# Patient Record
Sex: FEMALE | Race: BLACK OR AFRICAN AMERICAN | NOT HISPANIC OR LATINO | URBAN - METROPOLITAN AREA
[De-identification: names, ages, dates, MRNs, and addresses within clinical notes are randomized per-mention and may not be internally consistent; named-entity substitution may affect disease eponyms.]

---

## 2022-01-11 ENCOUNTER — OUTPATIENT (OUTPATIENT)
Dept: OUTPATIENT SERVICES | Facility: HOSPITAL | Age: 42
LOS: 1 days | End: 2022-01-11
Payer: COMMERCIAL

## 2022-01-11 ENCOUNTER — RESULT REVIEW (OUTPATIENT)
Age: 42
End: 2022-01-11

## 2022-01-11 ENCOUNTER — APPOINTMENT (OUTPATIENT)
Dept: RADIOLOGY | Facility: HOSPITAL | Age: 42
End: 2022-01-11

## 2022-01-11 DIAGNOSIS — R76.12 NONSPECIFIC REACTION TO CELL MEDIATED IMMUNITY MEASUREMENT OF GAMMA INTERFERON ANTIGEN RESPONSE WITHOUT ACTIVE TUBERCULOSIS: ICD-10-CM

## 2022-01-11 PROCEDURE — 71046 X-RAY EXAM CHEST 2 VIEWS: CPT | Mod: 26

## 2023-08-24 ENCOUNTER — EMERGENCY (EMERGENCY)
Facility: HOSPITAL | Age: 43
LOS: 0 days | Discharge: HOME | End: 2023-08-24
Attending: STUDENT IN AN ORGANIZED HEALTH CARE EDUCATION/TRAINING PROGRAM
Payer: COMMERCIAL

## 2023-08-24 VITALS
DIASTOLIC BLOOD PRESSURE: 77 MMHG | HEIGHT: 62 IN | HEART RATE: 77 BPM | TEMPERATURE: 98 F | RESPIRATION RATE: 18 BRPM | WEIGHT: 145.06 LBS | OXYGEN SATURATION: 100 % | SYSTOLIC BLOOD PRESSURE: 119 MMHG

## 2023-08-24 VITALS
RESPIRATION RATE: 16 BRPM | SYSTOLIC BLOOD PRESSURE: 104 MMHG | DIASTOLIC BLOOD PRESSURE: 62 MMHG | OXYGEN SATURATION: 100 % | HEART RATE: 75 BPM

## 2023-08-24 DIAGNOSIS — M79.606 PAIN IN LEG, UNSPECIFIED: ICD-10-CM

## 2023-08-24 DIAGNOSIS — Z88.2 ALLERGY STATUS TO SULFONAMIDES: ICD-10-CM

## 2023-08-24 DIAGNOSIS — M79.89 OTHER SPECIFIED SOFT TISSUE DISORDERS: ICD-10-CM

## 2023-08-24 DIAGNOSIS — Z87.42 PERSONAL HISTORY OF OTHER DISEASES OF THE FEMALE GENITAL TRACT: ICD-10-CM

## 2023-08-24 DIAGNOSIS — R06.02 SHORTNESS OF BREATH: ICD-10-CM

## 2023-08-24 LAB
ALBUMIN SERPL ELPH-MCNC: 3.3 G/DL — SIGNIFICANT CHANGE UP (ref 3.3–5)
ALP SERPL-CCNC: 53 U/L — SIGNIFICANT CHANGE UP (ref 40–120)
ALT FLD-CCNC: 22 U/L — SIGNIFICANT CHANGE UP (ref 12–78)
ANION GAP SERPL CALC-SCNC: 1 MMOL/L — LOW (ref 5–17)
AST SERPL-CCNC: 16 U/L — SIGNIFICANT CHANGE UP (ref 15–37)
BASOPHILS # BLD AUTO: 0.04 K/UL — SIGNIFICANT CHANGE UP (ref 0–0.2)
BASOPHILS NFR BLD AUTO: 0.9 % — SIGNIFICANT CHANGE UP (ref 0–2)
BILIRUB SERPL-MCNC: 0.1 MG/DL — LOW (ref 0.2–1.2)
BUN SERPL-MCNC: 10 MG/DL — SIGNIFICANT CHANGE UP (ref 7–23)
CALCIUM SERPL-MCNC: 8.6 MG/DL — SIGNIFICANT CHANGE UP (ref 8.5–10.1)
CHLORIDE SERPL-SCNC: 112 MMOL/L — HIGH (ref 96–108)
CO2 SERPL-SCNC: 26 MMOL/L — SIGNIFICANT CHANGE UP (ref 22–31)
CREAT SERPL-MCNC: 0.8 MG/DL — SIGNIFICANT CHANGE UP (ref 0.5–1.3)
EGFR: 113 ML/MIN/1.73M2 — SIGNIFICANT CHANGE UP
EOSINOPHIL # BLD AUTO: 0.05 K/UL — SIGNIFICANT CHANGE UP (ref 0–0.5)
EOSINOPHIL NFR BLD AUTO: 1.1 % — SIGNIFICANT CHANGE UP (ref 0–6)
GLUCOSE SERPL-MCNC: 106 MG/DL — HIGH (ref 70–99)
HCG SERPL-ACNC: <1 MIU/ML — SIGNIFICANT CHANGE UP
HCT VFR BLD CALC: 31.1 % — LOW (ref 39–50)
HGB BLD-MCNC: 9.9 G/DL — LOW (ref 13–17)
IMM GRANULOCYTES NFR BLD AUTO: 0.2 % — SIGNIFICANT CHANGE UP (ref 0–0.9)
LIDOCAIN IGE QN: 73 U/L — SIGNIFICANT CHANGE UP (ref 13–75)
LYMPHOCYTES # BLD AUTO: 1.79 K/UL — SIGNIFICANT CHANGE UP (ref 1–3.3)
LYMPHOCYTES # BLD AUTO: 40.8 % — SIGNIFICANT CHANGE UP (ref 13–44)
MAGNESIUM SERPL-MCNC: 2.1 MG/DL — SIGNIFICANT CHANGE UP (ref 1.6–2.6)
MCHC RBC-ENTMCNC: 25.8 PG — LOW (ref 27–34)
MCHC RBC-ENTMCNC: 31.8 G/DL — LOW (ref 32–36)
MCV RBC AUTO: 81 FL — SIGNIFICANT CHANGE UP (ref 80–100)
MONOCYTES # BLD AUTO: 0.45 K/UL — SIGNIFICANT CHANGE UP (ref 0–0.9)
MONOCYTES NFR BLD AUTO: 10.3 % — SIGNIFICANT CHANGE UP (ref 2–14)
NEUTROPHILS # BLD AUTO: 2.05 K/UL — SIGNIFICANT CHANGE UP (ref 1.8–7.4)
NEUTROPHILS NFR BLD AUTO: 46.7 % — SIGNIFICANT CHANGE UP (ref 43–77)
NRBC # BLD: 0 /100 WBCS — SIGNIFICANT CHANGE UP (ref 0–0)
NT-PROBNP SERPL-SCNC: 26 PG/ML — SIGNIFICANT CHANGE UP (ref 0–125)
PLATELET # BLD AUTO: 334 K/UL — SIGNIFICANT CHANGE UP (ref 150–400)
POTASSIUM SERPL-MCNC: 4.3 MMOL/L — SIGNIFICANT CHANGE UP (ref 3.5–5.3)
POTASSIUM SERPL-SCNC: 4.3 MMOL/L — SIGNIFICANT CHANGE UP (ref 3.5–5.3)
PROT SERPL-MCNC: 7.2 GM/DL — SIGNIFICANT CHANGE UP (ref 6–8.3)
RBC # BLD: 3.84 M/UL — LOW (ref 4.2–5.8)
RBC # FLD: 14.2 % — SIGNIFICANT CHANGE UP (ref 10.3–14.5)
SODIUM SERPL-SCNC: 139 MMOL/L — SIGNIFICANT CHANGE UP (ref 135–145)
TROPONIN I, HIGH SENSITIVITY RESULT: 3 NG/L — SIGNIFICANT CHANGE UP
WBC # BLD: 4.39 K/UL — SIGNIFICANT CHANGE UP (ref 3.8–10.5)
WBC # FLD AUTO: 4.39 K/UL — SIGNIFICANT CHANGE UP (ref 3.8–10.5)

## 2023-08-24 PROCEDURE — 93970 EXTREMITY STUDY: CPT | Mod: 26

## 2023-08-24 PROCEDURE — 71046 X-RAY EXAM CHEST 2 VIEWS: CPT | Mod: 26

## 2023-08-24 PROCEDURE — 99285 EMERGENCY DEPT VISIT HI MDM: CPT

## 2023-08-24 PROCEDURE — 93010 ELECTROCARDIOGRAM REPORT: CPT

## 2023-08-24 NOTE — ED ADULT NURSE NOTE - OBJECTIVE STATEMENT
received er bed p8 c/o b/l lower extremities noted with edema over past 2 weeks, sob upon rest and activity seen at pmd today for same and referred to er denies any chest pain denies cough, fever/chills

## 2023-08-24 NOTE — ED PROVIDER NOTE - CLINICAL SUMMARY MEDICAL DECISION MAKING FREE TEXT BOX
43F with PMH Fibroids who presents to ED with bilateral lower extremity swelling x 2 weeks. Pt reporting occasional shortness of breath over the past 2 weeks, pt denies current SOB. Pt denies any associated aggravating/alleviating factors associated with bilateral LE swelling. Pt went to PMD today and was sent to ED to r/o DVT. Denies hemoptysis, recent surgery/trauma, prolonged recent travel or immobilization, prior PE or DVT, history of recent malignancy with treatment, or hormone use. Patient currently afebrile, hemodynamically stable, spO2 100%. Based on history and physical, differentials include but are not limited to . Plan to assess patient for acute pathology as listed above with . Will administer medications for symptomatic relief, follow-up on results, and reassess. 43F with PMH Fibroids who presents to ED with bilateral lower extremity swelling x 2 weeks. Pt reporting occasional shortness of breath over the past 2 weeks, pt denies current SOB. Pt denies any associated aggravating/alleviating factors associated with bilateral LE swelling, pt denies any trauma/injury or recent falls. Pt went to PMD today and was sent to ED to r/o DVT. Denies hemoptysis, recent surgery/trauma, prolonged recent travel or immobilization, prior PE or DVT, history of recent malignancy with treatment, or hormone use. Patient currently afebrile, hemodynamically stable, spO2 100%. Based on history and physical, differentials include but are not limited to dependent edema, muscle strain/sprain/spasm, electrolyte abnormality, anemia, ACS, CHF, DVT. Plan to assess patient for acute pathology as listed above with labs, EKG, CXR, Duplex US BL LE. Will administer, medications for symptomatic relief, follow-up on results, and reassess.

## 2023-08-24 NOTE — ED PROVIDER NOTE - OBJECTIVE STATEMENT
43F with PMH Fibroids who presents to ED with bilateral lower extremity swelling x 2 weeks. Pt reporting occasional shortness of breath over the past 2 weeks, pt denies current SOB. Pt denies any associated aggravating/alleviating factors associated with bilateral LE swelling. Pt went to PMD today and was sent to ED to r/o DVT. Denies hemoptysis, recent surgery/trauma, prolonged recent travel or immobilization, prior PE or DVT, history of recent malignancy with treatment, or hormone use. Denies fever, chills, chest pain, shortness of breath, abdominal pain, N/V/D, dysuria, urinary frequency/urgency, extremity weakness/numbness/tingling, lightheadedness, dizziness, or headaches. 43F with PMH Fibroids who presents to ED with bilateral lower extremity swelling x 2 weeks. Pt reporting occasional shortness of breath over the past 2 weeks, pt denies current SOB. Pt denies any associated aggravating/alleviating factors associated with bilateral LE swelling, pt denies any trauma/injury or recent falls. Pt went to PMD today and was sent to ED to r/o DVT. Denies hemoptysis, recent surgery/trauma, prolonged recent travel or immobilization, prior PE or DVT, history of recent malignancy with treatment, or hormone use. Denies fever, chills, chest pain, shortness of breath, abdominal pain, N/V/D, dysuria, urinary frequency/urgency, extremity weakness/numbness/tingling, lightheadedness, dizziness, or headaches.

## 2023-08-24 NOTE — ED PROVIDER NOTE - PATIENT PORTAL LINK FT
You can access the FollowMyHealth Patient Portal offered by [facility] by registering at the following website: [email or web address]. By joining [facility] FollowMyHealth portal, you will also be able to view your health information using other applications (apps) compatible with our system.

## 2023-08-24 NOTE — ED ADULT NURSE NOTE - BREATH SOUNDS, MLM
Scotty DA    Diease activity 23 low level  Recommendation: D/C MTX    Called message left to call office to review results and recommendations. Diminished

## 2023-08-24 NOTE — ED PROVIDER NOTE - NS ED ATTENDING STATEMENT MOD
This was a shared visit with the NADYA. I reviewed and verified the documentation and independently performed the documented:

## 2023-08-24 NOTE — ED ADULT NURSE NOTE - NSFALLUNIVINTERV_ED_ALL_ED
Bed/Stretcher in lowest position, wheels locked, appropriate side rails in place/Call bell, personal items and telephone in reach/Instruct patient to call for assistance before getting out of bed/chair/stretcher/Non-slip footwear applied when patient is off stretcher/[place] to call system/Physically safe environment - no spills, clutter or unnecessary equipment/Purposeful proactive rounding/Room/bathroom lighting operational, light cord in reach

## 2023-08-24 NOTE — ED PROVIDER NOTE - NSFOLLOWUPINSTRUCTIONS_ED_ALL_ED_FT
Follow-up with Primary Care Physician as discussed.    Advance activity as tolerated.  Continue all previously prescribed medications as directed unless otherwise instructed.  Follow up with your primary care physician in 48-72 hours- bring copies of your results.  Return to the ER for worsening or persistent symptoms, and/or ANY NEW OR CONCERNING SYMPTOMS fever, chest pain, shortness of breath/difficulty breathing, abdominal pain with intractable nausea/vomiting, calf swelling/skin redness/warmth, difficulty with walking, weakness/numbness/tingling of the arms or legs, lightheadedness/dizziness, severe headaches. If you have issues obtaining follow up, please call: [phone] to obtain a doctor or specialist who takes your insurance in your area.  You may call [phone] to make an appointment with the internal medicine clinic.      Venous Thromboembolism Prevention    Venous thromboembolism (VTE) is a condition in which a blood clot (thrombus) develops in the body. A deep vein thrombosis (DVT) is a blood clot that usually occurs in a deep vein in the leg or the pelvis, but it can also occur in the arm.    Sometimes, pieces of a thrombus can break off and travel through the bloodstream to other parts of the body. When that happens, the thrombus is called an embolus. An embolus that travels to the lungs is called a pulmonary embolism. An embolism can block the blood flow in the blood vessels of other organs as well.    How can this condition affect me?  VTE is a serious health condition that can cause disability or death. It is very important to get help right away. Do not ignore your symptoms.    What can increase my risk?  You are more likely to develop this condition if you:    Have had recent major surgery or trauma.  Have certain health conditions, such as cancer.  Are in the hospital for a sudden illness.  Have not moved for a long period of time, such as if you are on bed rest or traveling a long distance.  Take certain medicines, such as birth control pills or hormone replacement therapy.  Are pregnant or recently gave birth.  Have a personal or family history of VTE.  Are overweight.  Are 60 years of age or older.  Use products that contain nicotine and tobacco.    What actions can I take to prevent this?      In the hospital    A VTE may be prevented by taking medicines that are prescribed to prevent blood clots (anticoagulants). You can also help to prevent VTE while in the hospital by taking these actions:    Get out of bed and walk. Ask your health care provider if this is safe for you to do.  Ask your health care provider if you should use a sequential compression device (SCD). This is a machine that pumps air into compression sleeves that are wrapped around your legs.  Ask your health care provider if you should wear tight, elastic stockings that apply pressure to the lower legs (compression stockings). Compression stockings are sometimes used with SCDs.        At home after surgery    Understand that you have an increased risk for VTE for the first 4–6 weeks after surgery. During this time:    Avoid traveling for more than 4 hours. If you must travel after surgery, ask your health care provider about additional preventive actions that you can take.  Avoid sitting or lying still for too long. If possible, get up and walk around one time every hour. Ask your health care provider when this is safe for you to do.        While traveling     Travel that takes more than 4 hours can increase the risk of a VTE. To prevent VTE when traveling:    Exercise your arms and legs every hour. You can do this by standing, stretching, and bending and straightening your arms and legs. If you are traveling by airplane, train, or bus, walk up and down the aisle as often as possible to get your blood moving. If you are traveling by car, stop and get out of the car every hour to exercise your arms and legs and stretch. Other types of exercise might include:    Keeping your feet flat on the ground and raising your toes.  Switching from tightening the muscles in your calves and thighs to relaxing those same muscles while you are sitting.  Pointing and flexing your feet at the ankle joints while you are sitting.  Drink enough water to keep your urine pale yellow.  Avoid drinking alcohol during long travel.    Generally, it is not recommended that you take medicines to prevent DVT during routine travel.        Activity     Stay active. Avoid sitting or lying in bed for long periods of time without moving your arms and legs.  Exercise by moving your arms and legs for 30 minutes or more every day.  Try not to bump or injure your legs.  Avoid crossing your legs when you are sitting.        Lifestyle    Do not use any products that contain nicotine or tobacco, such as cigarettes, e-cigarettes, and chewing tobacco. If you need help quitting, ask your health care provider. This is especially important if you take estrogen medicines.  Avoid wearing tight clothing around your legs or waist.        General information     Wear compression stockings as told by your health care provider. These stockings help to prevent blood clots and reduce swelling in your legs. Do not let them bunch up when you are wearing them.  Avoid using medicines that contain estrogen if you do not need them. These include birth control pills and hormone replacement therapy.  Do not use pillows under your knees while lying down unless told by your health care provider.  Maintain a healthy weight.    Where to find more information  Centers for Disease Control and Prevention: www.cdc.gov  American Heart Association: www.heart.org    Get help right away if:  You have chest pain or shortness of breath.  You cough up blood.  You have a rapid or irregular heartbeat.  You feel light-headed or dizzy.  You have new or increased pain, swelling, or redness in an arm or leg.  You have numbness or tingling in an arm or leg.  You have blood in your vomit, stool, or urine.    These symptoms may represent a serious problem that is an emergency. Do not wait to see if the symptoms will go away. Get medical help right away. Call your local emergency services (911 in the [place] Do not drive yourself to the hospital.    Summary  Venous thromboembolism (VTE) is a condition in which a blood clot (thrombus)develops in the body. A deep vein thrombosis (DVT) is a blood clot that usually occurs in a deep vein in the leg or the pelvis, but it can also occur in the arm.  VTE is a serious health condition that can cause disability or death. It is very important to get help right away. Do not ignore your symptoms.  If you are traveling, exercise your arms and legs every hour.  Stay active to help prevent blood clots. Avoid sitting or lying in bed for long periods of time without moving your arms and legs.    ADDITIONAL NOTES AND INSTRUCTIONS    Please follow up with your Primary MD in 24-48 hr.  Seek immediate medical care for any new/worsening signs or symptoms.

## 2023-08-24 NOTE — ED PROVIDER NOTE - MUSCULOSKELETAL, MLM
Spine appears normal, range of motion is not limited, no muscle or joint tenderness, no midline spinal tenderness. Moving all extremities equally, full strength against resistance, sensation intact, 2+ distal pulses, < 2 sec. capillary refill. Spine appears normal, range of motion is not limited, no muscle or joint tenderness, no midline spinal tenderness. Moving all extremities equally, full strength against resistance, sensation intact, 2+ distal pulses, < 2 sec. capillary refill. Bilateral calves symmetrical, non-tender to palpation, no palpable cords.

## 2023-08-24 NOTE — ED PROVIDER NOTE - PROGRESS NOTE DETAILS
SIVA Saldivar: Workup reviewed - labs within normal limits/not actionable, CXR with no infiltrate or consolidation, EKG NSR, BL Duplex US with No evidence of deep venous thrombosis in either lower extremity. Results endorsed including unexpected incidental findings (copy of reports provided to patient). Shared Decision Making - Reassessment performed. Patient is medically stable for discharge. Strict return precautions given, discussed red flag signs/symptoms. Patient to follow up with PMD. Patient displays understanding and agreeable with plan, comfortable with discharge plan home. Plan for discharge discussed with Dr. Carey who agrees with disposition and discharge plan.

## 2023-08-24 NOTE — ED PROVIDER NOTE - ATTENDING APP SHARED VISIT CONTRIBUTION OF CARE
42 y/o F with PMH fibroids presenting to the ED with lower extremity swelling.  Patient reports occasional SOB x 2 weeks but not having active symptoms.   She denies chest pain, hemoptysis, hormone usage, recent travel or immobilization.  Patient was sent to the ED to r/o a DVT.  In the ED, vitals stable.  Patient is well appearing in NAD.  Will obtain labs and CXR in setting of dyspnea and US to r/o DVT.  EKG is NSR.  I have low suspicion for a DVT/PE based on her vitals, history, and clinical findings. She is also PERC negative and Wells criteria negative. She has no risk factors for ACS.